# Patient Record
Sex: FEMALE | Race: WHITE | NOT HISPANIC OR LATINO | URBAN - METROPOLITAN AREA
[De-identification: names, ages, dates, MRNs, and addresses within clinical notes are randomized per-mention and may not be internally consistent; named-entity substitution may affect disease eponyms.]

---

## 2018-10-29 ENCOUNTER — EMERGENCY (EMERGENCY)
Facility: HOSPITAL | Age: 33
LOS: 1 days | Discharge: HOME | End: 2018-10-29
Attending: EMERGENCY MEDICINE | Admitting: EMERGENCY MEDICINE
Payer: COMMERCIAL

## 2018-10-29 VITALS
HEART RATE: 104 BPM | RESPIRATION RATE: 20 BRPM | WEIGHT: 113.1 LBS | TEMPERATURE: 98 F | OXYGEN SATURATION: 100 % | SYSTOLIC BLOOD PRESSURE: 131 MMHG | DIASTOLIC BLOOD PRESSURE: 86 MMHG

## 2018-10-29 DIAGNOSIS — R06.02 SHORTNESS OF BREATH: ICD-10-CM

## 2018-10-29 DIAGNOSIS — R00.2 PALPITATIONS: ICD-10-CM

## 2018-10-29 DIAGNOSIS — R07.89 OTHER CHEST PAIN: ICD-10-CM

## 2018-10-29 LAB
ALBUMIN SERPL ELPH-MCNC: 4.4 G/DL — SIGNIFICANT CHANGE UP (ref 3.3–5)
ALP SERPL-CCNC: 70 U/L — SIGNIFICANT CHANGE UP (ref 40–120)
ALT FLD-CCNC: 15 U/L — SIGNIFICANT CHANGE UP (ref 10–45)
ANION GAP SERPL CALC-SCNC: 16 MMOL/L — SIGNIFICANT CHANGE UP (ref 5–17)
APPEARANCE UR: CLEAR — SIGNIFICANT CHANGE UP
AST SERPL-CCNC: 19 U/L — SIGNIFICANT CHANGE UP (ref 10–40)
BASOPHILS NFR BLD AUTO: 0.4 % — SIGNIFICANT CHANGE UP (ref 0–2)
BILIRUB SERPL-MCNC: <0.2 MG/DL — SIGNIFICANT CHANGE UP (ref 0.2–1.2)
BILIRUB UR-MCNC: NEGATIVE — SIGNIFICANT CHANGE UP
BUN SERPL-MCNC: 9 MG/DL — SIGNIFICANT CHANGE UP (ref 7–23)
CALCIUM SERPL-MCNC: 9.8 MG/DL — SIGNIFICANT CHANGE UP (ref 8.4–10.5)
CHLORIDE SERPL-SCNC: 98 MMOL/L — SIGNIFICANT CHANGE UP (ref 96–108)
CK MB CFR SERPL CALC: 1.1 NG/ML — SIGNIFICANT CHANGE UP (ref 0–6.7)
CO2 SERPL-SCNC: 25 MMOL/L — SIGNIFICANT CHANGE UP (ref 22–31)
COLOR SPEC: YELLOW — SIGNIFICANT CHANGE UP
CREAT SERPL-MCNC: 0.72 MG/DL — SIGNIFICANT CHANGE UP (ref 0.5–1.3)
D DIMER BLD IA.RAPID-MCNC: 272 NG/ML DDU — HIGH
DIFF PNL FLD: NEGATIVE — SIGNIFICANT CHANGE UP
EOSINOPHIL NFR BLD AUTO: 3 % — SIGNIFICANT CHANGE UP (ref 0–6)
GLUCOSE SERPL-MCNC: 98 MG/DL — SIGNIFICANT CHANGE UP (ref 70–99)
GLUCOSE UR QL: NEGATIVE — SIGNIFICANT CHANGE UP
HCT VFR BLD CALC: 39.1 % — SIGNIFICANT CHANGE UP (ref 34.5–45)
HGB BLD-MCNC: 13.2 G/DL — SIGNIFICANT CHANGE UP (ref 11.5–15.5)
KETONES UR-MCNC: NEGATIVE — SIGNIFICANT CHANGE UP
LEUKOCYTE ESTERASE UR-ACNC: NEGATIVE — SIGNIFICANT CHANGE UP
LYMPHOCYTES # BLD AUTO: 38.5 % — SIGNIFICANT CHANGE UP (ref 13–44)
MCHC RBC-ENTMCNC: 30.1 PG — SIGNIFICANT CHANGE UP (ref 27–34)
MCHC RBC-ENTMCNC: 33.8 G/DL — SIGNIFICANT CHANGE UP (ref 32–36)
MCV RBC AUTO: 89.1 FL — SIGNIFICANT CHANGE UP (ref 80–100)
MONOCYTES NFR BLD AUTO: 10.1 % — SIGNIFICANT CHANGE UP (ref 2–14)
NEUTROPHILS NFR BLD AUTO: 48 % — SIGNIFICANT CHANGE UP (ref 43–77)
NITRITE UR-MCNC: NEGATIVE — SIGNIFICANT CHANGE UP
PH UR: 8.5 — HIGH (ref 5–8)
PLATELET # BLD AUTO: 313 K/UL — SIGNIFICANT CHANGE UP (ref 150–400)
POTASSIUM SERPL-MCNC: 4 MMOL/L — SIGNIFICANT CHANGE UP (ref 3.5–5.3)
POTASSIUM SERPL-SCNC: 4 MMOL/L — SIGNIFICANT CHANGE UP (ref 3.5–5.3)
PROT SERPL-MCNC: 8.1 G/DL — SIGNIFICANT CHANGE UP (ref 6–8.3)
PROT UR-MCNC: NEGATIVE MG/DL — SIGNIFICANT CHANGE UP
RBC # BLD: 4.39 M/UL — SIGNIFICANT CHANGE UP (ref 3.8–5.2)
RBC # FLD: 12.3 % — SIGNIFICANT CHANGE UP (ref 10.3–16.9)
SODIUM SERPL-SCNC: 139 MMOL/L — SIGNIFICANT CHANGE UP (ref 135–145)
SP GR SPEC: 1.01 — SIGNIFICANT CHANGE UP (ref 1–1.03)
TROPONIN T SERPL-MCNC: <0.01 NG/ML — SIGNIFICANT CHANGE UP (ref 0–0.01)
UROBILINOGEN FLD QL: 0.2 E.U./DL — SIGNIFICANT CHANGE UP
WBC # BLD: 5 K/UL — SIGNIFICANT CHANGE UP (ref 3.8–10.5)
WBC # FLD AUTO: 5 K/UL — SIGNIFICANT CHANGE UP (ref 3.8–10.5)

## 2018-10-29 PROCEDURE — 71046 X-RAY EXAM CHEST 2 VIEWS: CPT | Mod: 26

## 2018-10-29 PROCEDURE — 99285 EMERGENCY DEPT VISIT HI MDM: CPT | Mod: 25

## 2018-10-29 PROCEDURE — 93010 ELECTROCARDIOGRAM REPORT: CPT | Mod: 59

## 2018-10-29 RX ORDER — SODIUM CHLORIDE 9 MG/ML
1000 INJECTION INTRAMUSCULAR; INTRAVENOUS; SUBCUTANEOUS ONCE
Qty: 0 | Refills: 0 | Status: COMPLETED | OUTPATIENT
Start: 2018-10-29 | End: 2018-10-29

## 2018-10-29 RX ADMIN — SODIUM CHLORIDE 1500 MILLILITER(S): 9 INJECTION INTRAMUSCULAR; INTRAVENOUS; SUBCUTANEOUS at 22:52

## 2018-10-29 NOTE — ED ADULT NURSE NOTE - NSIMPLEMENTINTERV_GEN_ALL_ED
Implemented All Universal Safety Interventions:  [place] to call system. Call bell, personal items and telephone within reach. Instruct patient to call for assistance. Room bathroom lighting operational. Non-slip footwear when patient is off stretcher. Physically safe environment: no spills, clutter or unnecessary equipment. Stretcher in lowest position, wheels locked, appropriate side rails in place.

## 2018-10-30 VITALS
DIASTOLIC BLOOD PRESSURE: 70 MMHG | TEMPERATURE: 98 F | SYSTOLIC BLOOD PRESSURE: 103 MMHG | RESPIRATION RATE: 16 BRPM | HEART RATE: 80 BPM | OXYGEN SATURATION: 99 %

## 2018-10-30 PROCEDURE — 99284 EMERGENCY DEPT VISIT MOD MDM: CPT | Mod: 25

## 2018-10-30 PROCEDURE — 81003 URINALYSIS AUTO W/O SCOPE: CPT

## 2018-10-30 PROCEDURE — 71275 CT ANGIOGRAPHY CHEST: CPT

## 2018-10-30 PROCEDURE — 80053 COMPREHEN METABOLIC PANEL: CPT

## 2018-10-30 PROCEDURE — 71275 CT ANGIOGRAPHY CHEST: CPT | Mod: 26

## 2018-10-30 PROCEDURE — 85379 FIBRIN DEGRADATION QUANT: CPT

## 2018-10-30 PROCEDURE — 36415 COLL VENOUS BLD VENIPUNCTURE: CPT

## 2018-10-30 PROCEDURE — 84484 ASSAY OF TROPONIN QUANT: CPT

## 2018-10-30 PROCEDURE — 93005 ELECTROCARDIOGRAM TRACING: CPT

## 2018-10-30 PROCEDURE — 85025 COMPLETE CBC W/AUTO DIFF WBC: CPT

## 2018-10-30 PROCEDURE — 82550 ASSAY OF CK (CPK): CPT

## 2018-10-30 PROCEDURE — 71046 X-RAY EXAM CHEST 2 VIEWS: CPT

## 2018-10-30 PROCEDURE — 82553 CREATINE MB FRACTION: CPT

## 2018-10-30 PROCEDURE — 87086 URINE CULTURE/COLONY COUNT: CPT

## 2018-10-30 NOTE — ED PROVIDER NOTE - MEDICAL DECISION MAKING DETAILS
Pt well appearing, NAD and vSS. Labs, ekg, cxr and ct was neg. Pt with tachycardia, sob, and chest pain under 24 hr with no risk factors for cardiac was r/o for PE. Mild elevated d-dimer and neg trop. CTA was clear and recommend to f/u with her cardiologist. Most likely anxiety related symptoms.

## 2018-10-30 NOTE — ED PROVIDER NOTE - ATTENDING CONTRIBUTION TO CARE
I have seen the pt, reviewed all pertinent clinical data, and I agree with the documentation/care/plan executed by REYES Edwards.

## 2018-10-30 NOTE — ED PROVIDER NOTE - OBJECTIVE STATEMENT
33 y/o f with h/o anxiety present to ED c/o sob since last night with chest pain.  Patient report to actually have a cardiology appointment this soon this week but was concern because she felt her symptoms were worse. Admit of mid chest pain non radiating, without n, v, dizziness. Denies recent cold symptoms, fever,  cough, no BC, calf pain, , recent surgeries or travel. No smoking.  Pt appears very anxious and when asked about stress factors she became emotional and started to cry. She report of having the sob for a few days but the chest pain began yesterday.

## 2018-10-31 LAB
CULTURE RESULTS: NO GROWTH — SIGNIFICANT CHANGE UP
SPECIMEN SOURCE: SIGNIFICANT CHANGE UP

## 2020-03-02 NOTE — ED PROVIDER NOTE - CPE EDP NEURO NORM
No cp ,n o osb act     Cardiology follow up note    Date: 3/16/2020    Problem List:  Patient Active Problem List   Diagnosis   • Undiagnosed cardiac murmurs   • Dyslipidemia   • Obesity, Class III, BMI 40-49.9 (morbid obesity) (CMS/[facility])   • Essential hypertension, benign   • Dystonia   • Anxiety and depression   • Early onset Alzheimer's dementia without behavioral disturbance (CMS/[facility])   • Paranoid schizophrenia (CMS/[facility])   • Pain of right lower leg   • Right leg pain   • Hyposomnia, insomnia, or sleeplessness associated with anxiety    .     CC:Hypertension, dyspnea exertion, abnormal EKG.  S: Nolvia Caldera is a 81 year old female who with a History of  has a past medical history of Anxiety and depression, Breast cancer (2001), DM (diabetes mellitus) (CMS/[facility]), HLD (hyperlipidemia), Hyposomnia, insomnia, or sleeplessness associated with anxiety, and Urinary tract infection., patient is here to follow up for Hypertension, dyspnea exertion, abnormal EKG.. The patient had no acute events. Patient can do 4 mets without dyspnea  No chest pain, mild shortness of breath. Patient is taking all  cardiac  medications       Review of Systems:    Constitutional: Negative for fever and chills.   HEENT: Negative for ear pain or sore throat.  Respiratory: Negative cough or hemoptysis.    Gastrointestinal: Negative for nausea, vomiting, diarrhea or abdominal pain.   Genitourinary: Negative for dysuria, urgency or frequency.              Neurological: Negative for headache, loss of consciousness, confusion                         All other systems are reviewed and are negative except as documented in the HPI (History of Present Illness).    Medications:  Current Outpatient Medications   Medication Sig Dispense Refill   • umeclidinium-vilanterol (ANORO ELLIPTA) 62.5-25 MCG/INH inhaler Inhale 1 puff into the lungs daily. 1 each 11   • albuterol 108 (90 Base) MCG/ACT inhaler Inhale 2 puffs into the lungs every 4 hours as needed  for Shortness of Breath or Wheezing. 1 Inhaler 11   • donepezil (ARICEPT) 23 MG Tab Take 1 tablet by mouth nightly. Dose increased 1/6/2020 90 tablet 1   • metoPROLOL succinate (TOPROL-XL) 50 MG 24 hr tablet Take 1 tablet by mouth daily. 90 tablet 1   • traZODone (DESYREL) 100 MG tablet Take 1 tablet by mouth nightly. Cancel refills on file. 90 tablet 3   • risperiDONE (RISPERDAL) 3 MG tablet Take 1 tablet by mouth nightly. Indications: Schizophrenia Cancel refills on file. Thanks 90 tablet 3   • simvastatin (ZOCOR) 20 MG tablet TAKE 1 TABLET NIGHTLY 90 tablet 2   • amLODIPine (NORVASC) 10 MG tablet TAKE 1 TABLET DAILY 90 tablet 1   • levocetirizine (XYZAL ALLERGY 24HR) 5 MG tablet Take 1 tablet by mouth every evening. 100 tablet 3   • ergocalciferol (DRISDOL) 37505 units capsule 1 po on the 1st and 15th of every month 6 capsule 4   • lisinopril (ZESTRIL) 20 MG tablet Take 1 tablet by mouth daily. 90 tablet 2   • meloxicam (MOBIC) 7.5 MG tablet Take 1 tablet by mouth daily. 90 tablet 1   • Cranberry 28459 MG Cap Take 12,600 mg by mouth daily.     • latanoprost (XALATAN) 0.005 % ophthalmic solution Place 1 drop into both eyes nightly.     • Multiple Vitamins-Minerals (MULTIVITAMIN PO) Take 1 capsule by mouth daily.     • isosorbide mononitrate (IMDUR) 30 MG 24 hr tablet Take 1 tablet by mouth daily. 30 tablet 0     No current facility-administered medications for this visit.        Allergies:  ALLERGIES: no known allergies.    Social History:  PAST MEDICAL HX:    DM (diabetes mellitus) (CMS/[facility])                              HLD (hyperlipidemia)                                          Hyposomnia, insomnia, or sleeplessness associa*               Urinary tract infection                                       Breast cancer                                   2001            Comment: S/P surgery and 5 years of treatment with                tamoxifen    Anxiety and depression                                        PAST  SURGICAL HX:    DEXA BONE DENSITY AXIAL SKELETON                2008    COLONOSCOPY DIAGNOSTIC                          2008      Comment: Colonoscopy, Dx    MASTECTOMY                                                    MASTECTOMY PARTIAL                                            BREAST SURGERY                                                APPENDECTOMY                                                  CHOLECYSTECTOMY                                 1980          REMOVAL GALLBLADDER                                           Family History   Problem Relation Age of Onset   • Cancer Mother 66        Lung/Smoker   • COPD Mother    • Cancer Father          of leukemia   • Cancer Brother    • High blood pressure Brother    • Psychiatric Brother    • Depression Brother    • High blood pressure Sister    • High blood pressure Sister      Review of patient's family status indicates:    Mother                                             Comment:  of lung cancer    Father                                             Comment:  of leukemia    Brother                        Alive                     Sister                         Alive                     Sister                         Alive                     Daughter                       Alive                     Son                            Alive                     Daughter                       Alive                       Social History     Socioeconomic History   • Marital status:      Spouse name: Not on file   • Number of children: 4   • Years of education: 12   • Highest education level: Not on file   Occupational History   • Occupation: Retired     Comment: Clerical work   Social Needs   • Financial resource strain: Not on file   • Food insecurity:     Worry: Not on file     Inability: Not on file   • Transportation needs:     Medical: Not on file     Non-medical: Not on file   Tobacco Use   • Smoking status: Never  Smoker   • Smokeless tobacco: Never Used   Substance and Sexual Activity   • Alcohol use: No   • Drug use: No   • Sexual activity: Not Currently   Lifestyle   • Physical activity:     Days per week: Not on file     Minutes per session: Not on file   • Stress: Not on file   Relationships   • Social connections:     Talks on phone: Not on file     Gets together: Not on file     Attends [employer]ism service: Not on file     Active member of club or organization: Not on file     Attends meetings of clubs or organizations: Not on file     Relationship status: Not on file   • Intimate partner violence:     Fear of current or ex partner: Not on file     Emotionally abused: Not on file     Physically abused: Not on file     Forced sexual activity: Not on file   Other Topics Concern   • Not on file   Social History Narrative    EDUCATION LEVEL: Completed High School    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 Moved to [place] in 1991 and then to [place] in 2010, to be closer to his children. Lives alone in [facility] [place] [address]. Spends time watching TV, doing word searches and healthy brain activities. Starting to go to a new [employer] to be more involved. Independent in all ADLs and IADLs. Avoid bathing at times. Driving, but family concerned. No accidents.    SOCIAL SUPPORT: Family and friends. Has 4 children, 3 living - 2 daughters, and 1 son.    ASSISTIVE DEVICE: None    ADAPTIVE DEVICES: Glasses    MEDICATION SETUP: Started with automatic dispenser 8/2017. Daughter fills it up.    MEDICATION ADHERENCE: Excellent. Never forgets taking medications.         ADVANCE DIRECTIVES: POA papers in [employer]. Primary POA is bobby Knight. Secondary POA is Vinnie Caldera.    CODE STATUS: Full Code. To be discussed at next visit.         Updated by Arnol Winchester MD on 3/5/2018         O:   Visit Vitals  /60 (BP Location: LUE - Left  upper extremity, Patient Position: Sitting, Cuff Size: Regular)   Pulse 68   Resp 16   Ht 5' 4\" (1.626 m)   Wt 106.3 kg   BMI 40.23 kg/m²       Vital Most Recent Value First Value   Weight 106.3 kg Weight: 106.3 kg   Height 5' 4\" (162.6 cm) Height: 5' 4\" (162.6 cm)         Physical Exam:    Neurological/psychiatric. Patient is oriented to time place and person. Patient does not have anxiety or agitation  Constitutional: Well-developed appear in good nutrition.  Mouth/Throat: Oropharynx is clear and moist.   Eyes: Conjunctivae is  normal. Bilateral pupils are round and normal diameter.    Neck:   No obvious jugular vein distention no santacruz A wave    Cardiovascular:    Palpation of the heart demonstrated normal size and location of point of  maximal impact, no thrills, no palpable S3.   Auscultation of the heart demonstrated :sinus  rhythm normal rate  normal S1 and S2 no  murmur.   Carotid arteries: have no bruit.   Abdominal aorta: No palpable abdominal mass no bruit.   Femoral artery: Pulse +1 bilaterally, no bruits   Pedal pulses:Pulse +1 bilaterally.   Bilateral lower extremity edema negative    Gastrointestinal/Abdominal: Soft. Bowel sounds are normal.  no distension and no mass. No significant enlargement of liver and spleen.    Respiratory: Normal respiratory effort Breath sounds  No respiratory distress.  no wheezes.  no rales.      Skin: no rash, warm              Assessment and Plan:  Hypertension, blood pressure in good control continue current medications.          Brian Broderick MD    [phone]    [facility][note 3][facility]/ [place] [facility]     normal...

## 2020-04-17 NOTE — ED PROVIDER NOTE - CPE EDP [facility] NORM
illness 2019 and given an opportunity for questions and concerns. The Patient  agrees to contact the COVID-19 hotline [phone] or PCP office for questions related to their healthcare. CTN/ACM provided contact information for future reference. From CDC: Are you at higher risk for severe illness?  Wash your hands often.  Avoid close contact (6 feet, which is about two arm lengths) with people who are sick.  Put distance between yourself and other people if COVID-19 is spreading in your community.  Clean and disinfect frequently touched surfaces.  Avoid all cruise travel and non-essential air travel.  Call your healthcare professional if you have concerns about COVID-19 and your underlying condition or if you are sick. For more information on steps you can take to protect yourself, see CDC's How to Protect Yourself     [facility] continue to follow the Pt.     Follow Up  Future Appointments   Date Time Provider Lottie Mini   6/8/2020  9:00 AM [facility]       Roberto Coreas RN
normal...

## 2021-04-14 ENCOUNTER — APPOINTMENT (OUTPATIENT)
Dept: DISASTER EMERGENCY | Facility: OTHER | Age: 36
End: 2021-04-14
Payer: COMMERCIAL

## 2021-04-14 PROCEDURE — 0002A: CPT

## 2021-09-16 ENCOUNTER — EMERGENCY (EMERGENCY)
Facility: HOSPITAL | Age: 36
LOS: 1 days | Discharge: HOME | End: 2021-09-16
Attending: EMERGENCY MEDICINE | Admitting: EMERGENCY MEDICINE
Payer: COMMERCIAL

## 2021-09-16 VITALS
DIASTOLIC BLOOD PRESSURE: 70 MMHG | OXYGEN SATURATION: 99 % | SYSTOLIC BLOOD PRESSURE: 110 MMHG | HEART RATE: 85 BPM | TEMPERATURE: 98 F | RESPIRATION RATE: 20 BRPM

## 2021-09-16 VITALS
TEMPERATURE: 98 F | HEART RATE: 87 BPM | OXYGEN SATURATION: 100 % | SYSTOLIC BLOOD PRESSURE: 134 MMHG | WEIGHT: 134.92 LBS | DIASTOLIC BLOOD PRESSURE: 81 MMHG | RESPIRATION RATE: 18 BRPM

## 2021-09-16 DIAGNOSIS — R10.31 RIGHT LOWER QUADRANT PAIN: ICD-10-CM

## 2021-09-16 DIAGNOSIS — Z87.42 PERSONAL HISTORY OF OTHER DISEASES OF THE FEMALE GENITAL TRACT: ICD-10-CM

## 2021-09-16 DIAGNOSIS — J45.909 UNSPECIFIED ASTHMA, UNCOMPLICATED: ICD-10-CM

## 2021-09-16 DIAGNOSIS — Z20.822 CONTACT WITH AND (SUSPECTED) EXPOSURE TO COVID-19: ICD-10-CM

## 2021-09-16 DIAGNOSIS — N83.201 UNSPECIFIED OVARIAN CYST, RIGHT SIDE: ICD-10-CM

## 2021-09-16 LAB
ALBUMIN SERPL ELPH-MCNC: 4.6 G/DL — SIGNIFICANT CHANGE UP (ref 3.3–5)
ALP SERPL-CCNC: 70 U/L — SIGNIFICANT CHANGE UP (ref 40–120)
ALT FLD-CCNC: 8 U/L — LOW (ref 10–45)
ANION GAP SERPL CALC-SCNC: 9 MMOL/L — SIGNIFICANT CHANGE UP (ref 5–17)
APPEARANCE UR: CLEAR — SIGNIFICANT CHANGE UP
APTT BLD: 29.6 SEC — SIGNIFICANT CHANGE UP (ref 27.5–35.5)
AST SERPL-CCNC: 14 U/L — SIGNIFICANT CHANGE UP (ref 10–40)
BACTERIA # UR AUTO: PRESENT /HPF
BASOPHILS # BLD AUTO: 0.06 K/UL — SIGNIFICANT CHANGE UP (ref 0–0.2)
BASOPHILS NFR BLD AUTO: 0.8 % — SIGNIFICANT CHANGE UP (ref 0–2)
BILIRUB SERPL-MCNC: 0.3 MG/DL — SIGNIFICANT CHANGE UP (ref 0.2–1.2)
BILIRUB UR-MCNC: NEGATIVE — SIGNIFICANT CHANGE UP
BUN SERPL-MCNC: 8 MG/DL — SIGNIFICANT CHANGE UP (ref 7–23)
CALCIUM SERPL-MCNC: 9.9 MG/DL — SIGNIFICANT CHANGE UP (ref 8.4–10.5)
CHLORIDE SERPL-SCNC: 104 MMOL/L — SIGNIFICANT CHANGE UP (ref 96–108)
CO2 SERPL-SCNC: 23 MMOL/L — SIGNIFICANT CHANGE UP (ref 22–31)
COLOR SPEC: YELLOW — SIGNIFICANT CHANGE UP
CREAT SERPL-MCNC: 0.81 MG/DL — SIGNIFICANT CHANGE UP (ref 0.5–1.3)
DIFF PNL FLD: NEGATIVE — SIGNIFICANT CHANGE UP
EOSINOPHIL # BLD AUTO: 0.47 K/UL — SIGNIFICANT CHANGE UP (ref 0–0.5)
EOSINOPHIL NFR BLD AUTO: 6.3 % — HIGH (ref 0–6)
EPI CELLS # UR: SIGNIFICANT CHANGE UP /HPF (ref 0–5)
GLUCOSE SERPL-MCNC: 104 MG/DL — HIGH (ref 70–99)
GLUCOSE UR QL: NEGATIVE — SIGNIFICANT CHANGE UP
HCT VFR BLD CALC: 36.9 % — SIGNIFICANT CHANGE UP (ref 34.5–45)
HGB BLD-MCNC: 12.2 G/DL — SIGNIFICANT CHANGE UP (ref 11.5–15.5)
IMM GRANULOCYTES NFR BLD AUTO: 0.1 % — SIGNIFICANT CHANGE UP (ref 0–1.5)
INR BLD: 0.96 — SIGNIFICANT CHANGE UP (ref 0.88–1.16)
KETONES UR-MCNC: NEGATIVE — SIGNIFICANT CHANGE UP
LACTATE SERPL-SCNC: 1.8 MMOL/L — SIGNIFICANT CHANGE UP (ref 0.5–2)
LEUKOCYTE ESTERASE UR-ACNC: NEGATIVE — SIGNIFICANT CHANGE UP
LYMPHOCYTES # BLD AUTO: 1.17 K/UL — SIGNIFICANT CHANGE UP (ref 1–3.3)
LYMPHOCYTES # BLD AUTO: 15.6 % — SIGNIFICANT CHANGE UP (ref 13–44)
MCHC RBC-ENTMCNC: 30 PG — SIGNIFICANT CHANGE UP (ref 27–34)
MCHC RBC-ENTMCNC: 33.1 GM/DL — SIGNIFICANT CHANGE UP (ref 32–36)
MCV RBC AUTO: 90.7 FL — SIGNIFICANT CHANGE UP (ref 80–100)
MONOCYTES # BLD AUTO: 0.42 K/UL — SIGNIFICANT CHANGE UP (ref 0–0.9)
MONOCYTES NFR BLD AUTO: 5.6 % — SIGNIFICANT CHANGE UP (ref 2–14)
NEUTROPHILS # BLD AUTO: 5.36 K/UL — SIGNIFICANT CHANGE UP (ref 1.8–7.4)
NEUTROPHILS NFR BLD AUTO: 71.6 % — SIGNIFICANT CHANGE UP (ref 43–77)
NITRITE UR-MCNC: POSITIVE
NRBC # BLD: 0 /100 WBCS — SIGNIFICANT CHANGE UP (ref 0–0)
PH UR: 7 — SIGNIFICANT CHANGE UP (ref 5–8)
PLATELET # BLD AUTO: 295 K/UL — SIGNIFICANT CHANGE UP (ref 150–400)
POTASSIUM SERPL-MCNC: 4.4 MMOL/L — SIGNIFICANT CHANGE UP (ref 3.5–5.3)
POTASSIUM SERPL-SCNC: 4.4 MMOL/L — SIGNIFICANT CHANGE UP (ref 3.5–5.3)
PROT SERPL-MCNC: 8.2 G/DL — SIGNIFICANT CHANGE UP (ref 6–8.3)
PROT UR-MCNC: NEGATIVE MG/DL — SIGNIFICANT CHANGE UP
PROTHROM AB SERPL-ACNC: 11.5 SEC — SIGNIFICANT CHANGE UP (ref 10.6–13.6)
RBC # BLD: 4.07 M/UL — SIGNIFICANT CHANGE UP (ref 3.8–5.2)
RBC # FLD: 11.9 % — SIGNIFICANT CHANGE UP (ref 10.3–14.5)
SARS-COV-2 RNA SPEC QL NAA+PROBE: NEGATIVE — SIGNIFICANT CHANGE UP
SODIUM SERPL-SCNC: 136 MMOL/L — SIGNIFICANT CHANGE UP (ref 135–145)
SP GR SPEC: 1.01 — SIGNIFICANT CHANGE UP (ref 1–1.03)
UROBILINOGEN FLD QL: 0.2 E.U./DL — SIGNIFICANT CHANGE UP
WBC # BLD: 7.49 K/UL — SIGNIFICANT CHANGE UP (ref 3.8–10.5)
WBC # FLD AUTO: 7.49 K/UL — SIGNIFICANT CHANGE UP (ref 3.8–10.5)
WBC UR QL: < 5 /HPF — SIGNIFICANT CHANGE UP

## 2021-09-16 PROCEDURE — 96376 TX/PRO/DX INJ SAME DRUG ADON: CPT

## 2021-09-16 PROCEDURE — 83605 ASSAY OF LACTIC ACID: CPT

## 2021-09-16 PROCEDURE — 85730 THROMBOPLASTIN TIME PARTIAL: CPT

## 2021-09-16 PROCEDURE — 85610 PROTHROMBIN TIME: CPT

## 2021-09-16 PROCEDURE — 76830 TRANSVAGINAL US NON-OB: CPT

## 2021-09-16 PROCEDURE — 74177 CT ABD & PELVIS W/CONTRAST: CPT | Mod: 26,MA

## 2021-09-16 PROCEDURE — 87086 URINE CULTURE/COLONY COUNT: CPT

## 2021-09-16 PROCEDURE — 85025 COMPLETE CBC W/AUTO DIFF WBC: CPT

## 2021-09-16 PROCEDURE — 80053 COMPREHEN METABOLIC PANEL: CPT

## 2021-09-16 PROCEDURE — 96375 TX/PRO/DX INJ NEW DRUG ADDON: CPT

## 2021-09-16 PROCEDURE — 76830 TRANSVAGINAL US NON-OB: CPT | Mod: 26

## 2021-09-16 PROCEDURE — 36415 COLL VENOUS BLD VENIPUNCTURE: CPT

## 2021-09-16 PROCEDURE — 81001 URINALYSIS AUTO W/SCOPE: CPT

## 2021-09-16 PROCEDURE — 74177 CT ABD & PELVIS W/CONTRAST: CPT | Mod: MA

## 2021-09-16 PROCEDURE — 76856 US EXAM PELVIC COMPLETE: CPT

## 2021-09-16 PROCEDURE — 76856 US EXAM PELVIC COMPLETE: CPT | Mod: 26

## 2021-09-16 PROCEDURE — 96374 THER/PROPH/DIAG INJ IV PUSH: CPT | Mod: XU

## 2021-09-16 PROCEDURE — 87635 SARS-COV-2 COVID-19 AMP PRB: CPT

## 2021-09-16 PROCEDURE — 99284 EMERGENCY DEPT VISIT MOD MDM: CPT | Mod: 25

## 2021-09-16 PROCEDURE — 99285 EMERGENCY DEPT VISIT HI MDM: CPT

## 2021-09-16 RX ORDER — KETOROLAC TROMETHAMINE 30 MG/ML
15 SYRINGE (ML) INJECTION ONCE
Refills: 0 | Status: DISCONTINUED | OUTPATIENT
Start: 2021-09-16 | End: 2021-09-16

## 2021-09-16 RX ORDER — MORPHINE SULFATE 50 MG/1
4 CAPSULE, EXTENDED RELEASE ORAL ONCE
Refills: 0 | Status: DISCONTINUED | OUTPATIENT
Start: 2021-09-16 | End: 2021-09-16

## 2021-09-16 RX ORDER — ONDANSETRON 8 MG/1
4 TABLET, FILM COATED ORAL ONCE
Refills: 0 | Status: COMPLETED | OUTPATIENT
Start: 2021-09-16 | End: 2021-09-16

## 2021-09-16 RX ORDER — IOHEXOL 300 MG/ML
30 INJECTION, SOLUTION INTRAVENOUS ONCE
Refills: 0 | Status: COMPLETED | OUTPATIENT
Start: 2021-09-16 | End: 2021-09-16

## 2021-09-16 RX ORDER — SODIUM CHLORIDE 9 MG/ML
1000 INJECTION INTRAMUSCULAR; INTRAVENOUS; SUBCUTANEOUS ONCE
Refills: 0 | Status: COMPLETED | OUTPATIENT
Start: 2021-09-16 | End: 2021-09-16

## 2021-09-16 RX ADMIN — IOHEXOL 30 MILLILITER(S): 300 INJECTION, SOLUTION INTRAVENOUS at 16:32

## 2021-09-16 RX ADMIN — SODIUM CHLORIDE 1000 MILLILITER(S): 9 INJECTION INTRAMUSCULAR; INTRAVENOUS; SUBCUTANEOUS at 16:32

## 2021-09-16 RX ADMIN — MORPHINE SULFATE 4 MILLIGRAM(S): 50 CAPSULE, EXTENDED RELEASE ORAL at 22:24

## 2021-09-16 RX ADMIN — ONDANSETRON 4 MILLIGRAM(S): 8 TABLET, FILM COATED ORAL at 18:51

## 2021-09-16 RX ADMIN — Medication 15 MILLIGRAM(S): at 16:32

## 2021-09-16 RX ADMIN — MORPHINE SULFATE 4 MILLIGRAM(S): 50 CAPSULE, EXTENDED RELEASE ORAL at 18:51

## 2021-09-16 NOTE — CONSULT NOTE ADULT - ASSESSMENT
35 y.o.  with RLQ pain, negative UPT, no vaginal bleeding, small R ovarian cyst but no evidence of torsion. Patient is hemodynamically stable. Rapidly expanding ovarian cyst can cause significant pain due to stretching of the ovarian capsule. This is the likely etiology of the patient's pain given absence of significant pathology of imaging. Treatment is supportive.    - No acute gyn intervention at this time  - Recommend Toradol 10 TID and/or Percocet for pain  - Patient should f/u with outpatient gyn in 1-2 weeks or return to ED if symptoms worsen/new symptoms arise.    Genny Gamboa MD PGY2  BAY Irvin MD PGY4  BAY Lainez MD

## 2021-09-16 NOTE — ED PROVIDER NOTE - PATIENT PORTAL LINK FT
You can access the FollowMyHealth Patient Portal offered by [facility] by registering at the following website: [email or web address]. By joining [employer]’s FollowMyHealth portal, you will also be able to view your health information using other applications (apps) compatible with our system.

## 2021-09-16 NOTE — CONSULT NOTE ADULT - SUBJECTIVE AND OBJECTIVE BOX
Patient is a 35 y.o.  s/p D&C 2 weeks ago who presents for RLQ since yesterday. The pain was initially dull, but became more burning and intermittently sharp today, 7/10. She saw her PCP in the office who was concerned for appendicitis and sent her to the ED. She endorses some nausea. Patient denies fever, chills, chest pain, SOB, vomiting, vaginal bleeding, abnormal vaginal discharge vaginal itching, dysuria.      Ob hx: VTOP D&C 2 weeks ago  Gyn hx: Small L ovarian cyst, resolved.  PMH: Asthma  Meds: Advair BID, Albbuterol PRN  PSH: D&C as above  Allergies: NKDA      Vital Signs Last 24 Hrs  T(C): 36.3 (16 Sep 2021 18:28), Max: 36.8 (16 Sep 2021 15:10)  T(F): 97.4 (16 Sep 2021 18:28), Max: 98.3 (16 Sep 2021 15:10)  HR: 65 (16 Sep 2021 18:28) (65 - 87)  BP: 119/76 (16 Sep 2021 18:28) (119/76 - 134/81)  BP(mean): --  RR: 16 (16 Sep 2021 18:28) (16 - 18)  SpO2: 96% (16 Sep 2021 18:28) (96% - 100%)      Physical Exam  Gen: Well-appearing. NAD.  Resp: Breathing comfortably on RA. Lungs CTAB.  CV: RRR, no murmurs.  Abd: Soft, TTP RLQ, non-distended, no rebound or guarding, +BS  Pelvic exam:      SSE: Small amount of crumbly, white vaginal discharge      BME: Small mobile uterus. No CMT. No adnexal tenderness.  Ext: No calf tenderness.      LABS:                        12.2   7.49  )-----------( 295      ( 16 Sep 2021 16:26 )             36.9     09-    136  |  104  |  8   ----------------------------<  104<H>  4.4   |  23  |  0.81    Ca    9.9      16 Sep 2021 16:26    TPro  8.2  /  Alb  4.6  /  TBili  0.3  /  DBili  x   /  AST  14  /  ALT  8<L>  /  AlkPhos  70  -    PT/INR - ( 16 Sep 2021 16:26 )   PT: 11.5 sec;   INR: 0.96          PTT - ( 16 Sep 2021 16:26 )  PTT:29.6 sec  Urinalysis Basic - ( 16 Sep 2021 16:34 )    Color: Yellow / Appearance: Clear / S.015 / pH: x  Gluc: x / Ketone: NEGATIVE  / Bili: Negative / Urobili: 0.2 E.U./dL   Blood: x / Protein: NEGATIVE mg/dL / Nitrite: POSITIVE   Leuk Esterase: NEGATIVE / RBC: x / WBC < 5 /HPF   Sq Epi: x / Non Sq Epi: 0-5 /HPF / Bacteria: Present /HPF          RADIOLOGY & ADDITIONAL STUDIES:      < from: US Transvaginal (21 @ 19:53) >  INTERPRETATION:  Pelvic ultrasound    History: Rule out torsion. Right sided ovarian cyst on CT.    Prior study:Same day CT of the abdomen and pelvis.    Findings: Transabdominal and transvaginal imaging of the pelvis was performed.    The uterus is normal in size and measures 7 x 4 x 5 cm. Posterior intramural/submucosal fundal fibroid measuring 2.7 x 2.5 x 2.7 cm. The endometrium is of normal thickness, measuring 0.4 cm. The cervix is normal.    The right ovary mildly enlarged, measures 4.1 x 3.8 x 3.1 cm (24 mL), and contains a 3.9 x 3.5 x 3.1 cm simple cyst. The left ovary is normal size and measures 3.2 x 1.9 x 1.7 cm (8 mL). Both arterial and venous blood flow are identified in each ovary on Doppler imaging.    There is no free fluid in the cul-de-sac.    Impression:    Simple right ovarian cyst measuring up to 3.9 cm, without evidence of torsionat the time of this exam. Intermittent right ovarian torsion cannot be excluded.      < end of copied text >    < from: CT Abdomen and Pelvis w/ Oral Cont and w/ IV Cont (21 @ 17:52) >  History: Gradually worsening right lower quadrant pain. Concern for appendicitis. History of ovarian cysts.    Technique: CT scan of abdomen and pelvis was performed from lung bases through symphysis pubis. Intravenous and oral contrast material were utilized. Axial, sagittal and coronal reformatted images were reviewed.    Comparison: None.    Findings:    Lower chest: Normal.    Liver:  Normal.    Gallbladder: No radiopaque stones gallbladder.    Spleen:  Normal.    Pancreas:  Normal.    Adrenal glands:  Normal.    Kidneys: Normal.    Adenopathy:  No lymphadenopathy in abdomen or pelvis.    Ascites: None.    Gastrointestinal tract: Normal appendix. No bowel obstruction or free air. No ascites.  Vessels: Normal.    Pelvic organs: 3.8 cm right ovarian cyst.    Soft tissues: Normal.    Bones: Normal.    IMPRESSION:    Normal appendix.    3.8 cm right ovarian cyst.    < end of copied text >

## 2021-09-16 NOTE — ED PROVIDER NOTE - OBJECTIVE STATEMENT
34 y/o F, denies PMHx, presents w/ RLQ pain x2 days. Patient states that pain was dull yesterday, today felt worse while lying in bed. Reports hx of ovarian cyst on left side, called her PCP who told her to come to the ED to r/o appendicitis. Patient states she did not take anything for pain. Denies urinary symptoms. LMP was months ago, reports having  on  w/ follow up US last week which showed that everything was ok.

## 2021-09-16 NOTE — ED PROVIDER NOTE - PHYSICAL EXAMINATION
CONSTITUTIONAL: Well-appearing; in no apparent distress.   HEAD: Normocephalic; atraumatic.   EYES:  conjunctiva and sclera clear  ENT: normal nose; no rhinorrhea; normal pharynx with no erythema or lesions.   NECK: Supple; non-tender;   CARDIOVASCULAR: Normal S1, S2; no murmurs, rubs, or gallops. Regular rate and rhythm.   RESPIRATORY: Breathing easily; breath sounds clear and equal bilaterally; no wheezes, rhonchi, or rales.  GI: Soft; non-distended; tender diffusely at RLQ, negative Berger's sign; no palpable organomegaly.   EXT: No cyanosis or edema; N/V intact  SKIN: Normal for age and race; warm; dry; good turgor; no apparent lesions or rash.   NEURO: A & O x 3; face symmetric; grossly unremarkable.   PSYCHOLOGICAL: The patient’s mood and manner are appropriate.

## 2021-09-16 NOTE — ED PROVIDER NOTE - NSFOLLOWUPINSTRUCTIONS_ED_ALL_ED_FT
Ovarian Cyst    WHAT YOU NEED TO KNOW:    An ovarian cyst is a fluid-filled sac that grows in or on an ovary. You have 2 ovaries, 1 on each side of your uterus. They are small, about the shape of an almond. Ovarian cysts are common in women who have regular monthly cycles. During your monthly cycle, eggs are released from the ovaries. The cyst usually contains fluid but may sometimes have blood or tissue in it. Most ovarian cysts are harmless and go away without treatment in a few months. Some cysts can grow large, cause pain, or break open.     Female Reproductive System         DISCHARGE INSTRUCTIONS:    Call your local emergency number (911 in the ) if:   •You have severe pain with fever and vomiting.      •You have sudden, severe abdominal pain.      •You are too weak, faint, or dizzy to stand up.      •You are breathing very quickly.      Call your doctor if:   •Your periods are early, late, or more painful than usual.      •You have questions or concerns about your condition or care.      Medicines: You may need any of the following:  •Birth control pills may help control your monthly cycle, prevent cysts, or cause them to shrink.      •Acetaminophen decreases pain and fever. It is available without a doctor's order. Ask how much to take and how often to take it. Follow directions. Read the labels of all other medicines you are using to see if they also contain acetaminophen, or ask your doctor or pharmacist. Acetaminophen can cause liver damage if not taken correctly. Do not use more than 4 grams (4,000 milligrams) total of acetaminophen in one day.       •NSAIDs, such as ibuprofen, help decrease swelling, pain, and fever. This medicine is available with or without a doctor's order. NSAIDs can cause stomach bleeding or kidney problems in certain people. If you take blood thinner medicine, always ask your healthcare provider if NSAIDs are safe for you. Always read the medicine label and follow directions.      •Prescription pain medicine may be given. Ask your healthcare provider how to take this medicine safely. Some prescription pain medicines contain acetaminophen. Do not take other medicines that contain acetaminophen without talking to your healthcare provider. Too much acetaminophen may cause liver damage. Prescription pain medicine may cause constipation. Ask your healthcare provider how to prevent or treat constipation.       •Take your medicine as directed. Contact your healthcare provider if you think your medicine is not helping or if you have side effects. Tell him or her if you are allergic to any medicine. Keep a list of the medicines, vitamins, and herbs you take. Include the amounts, and when and why you take them. Bring the list or the pill bottles to follow-up visits. Carry your medicine list with you in case of an emergency.      Manage ovarian cysts: You can manage a current cyst and help healthcare providers find future cysts early.  •Apply heat to decrease pain and cramping from a cyst. Sit in a warm bath, or place a heating pad (turned on low) on your abdomen. Do this for 15 to 20 minutes every hour for comfort.      •Get regular pelvic exams or Pap smears. This will help providers find any new ovarian cysts. Tell your healthcare provider about any unusual changes in your monthly cycle.      Follow up with your doctor or gynecologist as directed: Write down your questions so you remember to ask them during your visits.

## 2021-09-17 ENCOUNTER — TRANSCRIPTION ENCOUNTER (OUTPATIENT)
Age: 36
End: 2021-09-17

## 2021-09-18 LAB
CULTURE RESULTS: SIGNIFICANT CHANGE UP
SPECIMEN SOURCE: SIGNIFICANT CHANGE UP

## 2022-03-08 PROBLEM — Z00.00 ENCOUNTER FOR PREVENTIVE HEALTH EXAMINATION: Status: ACTIVE | Noted: 2022-03-08

## 2022-11-21 NOTE — ED ADULT NURSE NOTE - PRIMARY CARE PROVIDER
Incoming Refill Request      Medication requested (name and dose):   clonazePAM (KlonoPIN) 1 MG tablet    Pharmacy where request should be sent:   [facility] IN [place]    Additional details provided by patient:   NONE    Best call back number:   [phone]    Does the patient have less than a 3 day supply:  [x] Yes  [] No    Yamila Collier Rep  11/21/22, 10:41 CST           md

## 2023-06-20 ENCOUNTER — OFFICE (OUTPATIENT)
Dept: URBAN - METROPOLITAN AREA CLINIC 28 | Facility: CLINIC | Age: 38
Setting detail: OPHTHALMOLOGY
End: 2023-06-20
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
## 2023-06-20 PROCEDURE — NO SHOW FE NO SHOW FEE: Performed by: OPHTHALMOLOGY
